# Patient Record
(demographics unavailable — no encounter records)

---

## 2024-10-28 NOTE — HISTORY OF PRESENT ILLNESS
[___ Month(s) Ago] : [unfilled] month(s) ago [FreeTextEntry1] : oct 2024:  Patient overall feels well. No complaints. Off lupkins since oct 2nd 2024, awaiting Ianulumab trial enrollment Compliant with MMF max dose, HCQ 200mg BID,Lisinopril  Denies photosensitivity, malar rash, mouth ulcers, alopecia, dry eyes, dry mouth, joint pains, Raynaud's phenomenon, puffy fingers, pleuritic chest pain, SOB , previous blood transfusion, limb weakness, weight loss, anorexia, fever, chills, other skin rash [Weight Loss] : no weight loss [Malaise] : no malaise [Fever] : no fever [Chills] : no chills [Malar Facial Rash] : no malar facial rash [Oral Ulcers] : no oral ulcers [Cough] : no cough [Dry Mouth] : no dry mouth [Dysphagia] : no dysphagia [Arthralgias] : no arthralgias [Dyspnea] : no dyspnea [Dry Eyes] : no dry eyes

## 2024-10-28 NOTE — REASON FOR VISIT
[Follow-Up: _____] : a [unfilled] follow-up visit [Interpreters_IDNumber] : [id number] [TWNoteComboBox1] : Bermudian

## 2024-10-28 NOTE — PHYSICAL EXAM
[General Appearance - Alert] : alert [General Appearance - In No Acute Distress] : in no acute distress [Sclera] : the sclera and conjunctiva were normal [PERRL With Normal Accommodation] : pupils were equal in size, round, and reactive to light [Extraocular Movements] : extraocular movements were intact [Outer Ear] : the ears and nose were normal in appearance [Oropharynx] : the oropharynx was normal [Neck Appearance] : the appearance of the neck was normal [Neck Cervical Mass (___cm)] : no neck mass was observed [Jugular Venous Distention Increased] : there was no jugular-venous distention [Thyroid Diffuse Enlargement] : the thyroid was not enlarged [Thyroid Nodule] : there were no palpable thyroid nodules [Auscultation Breath Sounds / Voice Sounds] : lungs were clear to auscultation bilaterally [Heart Rate And Rhythm] : heart rate was normal and rhythm regular [Heart Sounds] : normal S1 and S2 [Heart Sounds Gallop] : no gallops [Murmurs] : no murmurs [Heart Sounds Pericardial Friction Rub] : no pericardial rub [Edema] : there was no peripheral edema [] : no rash [No Focal Deficits] : no focal deficits [Oriented To Time, Place, And Person] : oriented to person, place, and time [Impaired Insight] : insight and judgment were intact [Musculoskeletal - Swelling] : no joint swelling seen [Motor Tone] : muscle strength and tone were normal [Abdomen Soft] : soft [FreeTextEntry1] : No signs of synovitis, limitation of ROM or deformity in any joint.

## 2024-10-28 NOTE — REASON FOR VISIT
[Follow-Up: _____] : a [unfilled] follow-up visit [Interpreters_IDNumber] : [id number] [TWNoteComboBox1] : Bulgarian

## 2024-10-28 NOTE — ASSESSMENT
[FreeTextEntry1] : 37y with SLE with LN class V on last biopsy on 8/2019, with primary MSK symptoms here for f/u for SLE and LN class v Planning to enroll to Ianalumab trial .  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 = was on Benlysta (~feb 2021 - feb 2023) d/c due to lack of efficacy with persistent proteinuria  = On max CellCept 3g daily since 2019 = On HCQ 200mg qd  since 2014 = on Lisinopril 20mg qd  = voclosporin 23.7mg BID since 3/2023, pt pt has run out of medication briefly but now taking -Discussed at length about treatment options, rituximab vs ianalumab. Ianalumab is currently ongoing phase 3 clinical trial. Pt opted to consider ianalumab -Serolgies: normal complements, elevated dsDNA 14. UPCR >4G   Plan:  -Stop voclosporin since oct 2nd, for Ianalumab trial -Will reach out to clinical trial team for ianalumab -Continue cellcept 3g qd, HCQ 200mg qd, Farxiga and lisinopril -optho referral given for hcq use  # reproductive health  = has 3 children, s/p b/l tubal ligation   Research co ordinator(martir) will reach out to recruit for the trial   DW Dr. Barroso f/u in 3 months

## 2024-12-19 NOTE — HISTORY OF PRESENT ILLNESS
[FreeTextEntry1] : 39y F with SLE (+DONNA, dsDNA, low C', SSA/SSB>8) and LN class V presenting for follow up.  SLE hx: Dx w lupus 2014. CP, SOB. Denies any fevers, rashes, arthralgias, oral ulcers at that time. Pt says during that time was having renal involvement. She was given IV cytoxan, steroids. Pt was also put on plaquenil, enalapril during that time.   After initial tx until 11/18, pt was well. Pt in 11/18, pt again developed SOB, CP, fatigue, fevers, arthralgias, Raynauds, abdominal, diarrhea. Pt first tx w abx, then was tx for lupus flare, w high dose steroids. Pt also had renal bx in 2018, which showed lupus LN class V + III, mild fibrosis. Pt was put on cellcept, dc/ed on pred 40mg. Pt still continues w Plaquenil.  Pt was then admitted to [facility] 1/19, w anasarca and renal bx 2019 showed LN class V activity 0/24 and chronicity 1/12. Cellcept increased from 2g to 3g daily. Stopped cellcept and steroids in 4/19, as pt ran out of medications. Increase in proteinuria up to 6g. Pt was put on steroid taper, and restarted on cellcept by 5/19. Pt started a Kezar (proteinase inhibitor) lupus nephritis study. Pulsed 5/19 solumedrol 500mg X 3 days then placed on pred 20mg on 5/19. Has persistent proteinuria, despite full dose MMF, and trial benlysta 2/2021-2/2023  3/2023: off Benlysta now, dc due to no improvement on proteinuria reports compliance with Cellcept 3g daily, lisinopril 2.5mg daily, and lasix 20mg daily no LE swelling, but has progressively worse LE pain over 4 months, pain could be at front shin or calves  started on vocosporin on 3/21  No thrombotic events in past. No pregnancies/miscarriages.   7/22/2024: Pt has not followed up with rheumatology for about a year, pt states that there was miscommunication about the appt time and she didn't follow up. Seen to have proteinuria with labs from Dr Hood (nephro). Pt states overall she is feeling well, but has some b/l knee and hand pain that has been ongoing last 4 months that is worse with activity. Has been taking tylenol and intermittent motrin. Also endorses alopecia, Raynauds, dry eyes and dry mouth, and foamy urine. At times her dry mouth makes her feel like she is choking when swallowing solid foods. Denies oral ulcers, rash.  9/9/2024: Pt had renal bx 8/2024 which showed class V LN. Pt denies constitutional symptoms, weight loss, rash, oral ulcer, joint pains, sicca symptoms, hematuria. Has raynauds. Is taking cellcept, HCQ, voclosporin  oct 2024:  Patient overall feels well. No complaints. Off lupkins since oct 2nd 2024, awaiting Ianulumab trial enrollment Compliant with MMF max dose, HCQ 200mg BID,Lisinopril  Denies photosensitivity, malar rash, mouth ulcers, alopecia, dry eyes, dry mouth, joint pains, Raynaud's phenomenon, puffy fingers, pleuritic chest pain, SOB , previous blood transfusion, limb weakness, weight loss, anorexia, fever, chills, other skin rash  12/16/24: Pt states she is doing well, denies joint pain, alopecia, oral ulcers, malar rash. Endorses Raynauds. Saw Dr Arboleda last week to be evaluated for Ianalumab clinical trial, pending labs.  [Anorexia] : no anorexia [Weight Loss] : no weight loss [Malaise] : no malaise [Fever] : no fever [Chills] : no chills [Fatigue] : no fatigue [Depression] : no depression [Malar Facial Rash] : no malar facial rash [Skin Lesions] : no lesions [Skin Nodules] : no skin nodules [Oral Ulcers] : no oral ulcers [Cough] : no cough [Dry Mouth] : no dry mouth [Dysphonia] : no dysphonia [Dysphagia] : no dysphagia [Shortness of Breath] : no shortness of breath [Chest Pain] : no chest pain [Arthralgias] : no arthralgias [Joint Swelling] : no joint swelling [Joint Warmth] : no joint warmth [Joint Deformity] : no joint deformity [Decreased ROM] : no decreased range of motion [Morning Stiffness] : no morning stiffness [Falls] : no falls [Difficulty Standing] : no difficulty standing [Difficulty Walking] : no difficulty walking [Dyspnea] : no dyspnea [Myalgias] : no myalgias [Muscle Weakness] : no muscle weakness [Muscle Spasms] : no muscle spasms [Muscle Cramping] : no muscle cramping [Visual Changes] : no visual changes [Eye Pain] : no eye pain [Eye Redness] : no eye redness [Dry Eyes] : no dry eyes

## 2024-12-19 NOTE — ASSESSMENT
[FreeTextEntry1] : 39y with SLE with LN class V on last biopsy, with primary MSK symptoms here for f/u for SLE and LN class V Planning to enroll to Ianalumab trial .  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 Trialed on benlysta [id number] but no improvement of proteinuria -Persistent proteinuria in 2024, Bx : class v LN membranous, interstitial fibrosis and tubular atrophy  5-10%. = On max CellCept 3g daily since 2019 = On HCQ 200mg qd  since 2014 = on Lisinopril 20mg qd  = voclosporin 23.7mg BID since 3/2023, stopped 10/2024 to be eligible for Ianalumab study -Discussed at length about treatment options, rituximab vs ianalumab. Ianalumab is currently ongoing phase 3 clinical trial. Pt opted to consider ianalumab. First screening appt 12/11, pending labs to evaluate for eligibility for clinical trial  Plan:  -C/w cellcept 3g daily, HCQ 400mg qd, lisinopril -Continue to hold volclosporin since oct 2nd, for Ianalumab trial -Optho referral given for hcq use -Check CBC and CMP  # reproductive health  = has 3 children, s/p b/l tubal ligation   Research co ordinator(martir) will reach out about lab work/eligibility for trial  DW Dr. Barroso f/u in 3 months, if not accepted to trial, f/u Dick

## 2024-12-19 NOTE — HISTORY OF PRESENT ILLNESS
[FreeTextEntry1] : 39y F with SLE (+DONNA, dsDNA, low C', SSA/SSB>8) and LN class V presenting for follow up.  SLE hx: Dx w lupus 2014. CP, SOB. Denies any fevers, rashes, arthralgias, oral ulcers at that time. Pt says during that time was having renal involvement. She was given IV cytoxan, steroids. Pt was also put on plaquenil, enalapril during that time.   After initial tx until 11/18, pt was well. Pt in 11/18, pt again developed SOB, CP, fatigue, fevers, arthralgias, Raynauds, abdominal, diarrhea. Pt first tx w abx, then was tx for lupus flare, w high dose steroids. Pt also had renal bx in 2018, which showed lupus LN class V + III, mild fibrosis. Pt was put on cellcept, dc/ed on pred 40mg. Pt still continues w Plaquenil.  Pt was then admitted to [facility] 1/19, w anasarca and renal bx 2019 showed LN class V activity 0/24 and chronicity 1/12. Cellcept increased from 2g to 3g daily. Stopped cellcept and steroids in 4/19, as pt ran out of medications. Increase in proteinuria up to 6g. Pt was put on steroid taper, and restarted on cellcept by 5/19. Pt started a Kezar (proteinase inhibitor) lupus nephritis study. Pulsed 5/19 solumedrol 500mg X 3 days then placed on pred 20mg on 5/19. Has persistent proteinuria, despite full dose MMF, and trial benlysta 2/2021-2/2023  3/2023: off Benlysta now, dc due to no improvement on proteinuria reports compliance with Cellcept 3g daily, lisinopril 2.5mg daily, and lasix 20mg daily no LE swelling, but has progressively worse LE pain over 4 months, pain could be at front shin or calves  started on vocosporin on 3/21  No thrombotic events in past. No pregnancies/miscarriages.   7/22/2024: Pt has not followed up with rheumatology for about a year, pt states that there was miscommunication about the appt time and she didn't follow up. Seen to have proteinuria with labs from Dr Hood (nephro). Pt states overall she is feeling well, but has some b/l knee and hand pain that has been ongoing last 4 months that is worse with activity. Has been taking tylenol and intermittent motrin. Also endorses alopecia, Raynauds, dry eyes and dry mouth, and foamy urine. At times her dry mouth makes her feel like she is choking when swallowing solid foods. Denies oral ulcers, rash.  9/9/2024: Pt had renal bx 8/2024 which showed class V LN. Pt denies constitutional symptoms, weight loss, rash, oral ulcer, joint pains, sicca symptoms, hematuria. Has raynauds. Is taking cellcept, HCQ, voclosporin  oct 2024:  Patient overall feels well. No complaints. Off lupkins since oct 2nd 2024, awaiting Ianulumab trial enrollment Compliant with MMF max dose, HCQ 200mg BID,Lisinopril  Denies photosensitivity, malar rash, mouth ulcers, alopecia, dry eyes, dry mouth, joint pains, Raynaud's phenomenon, puffy fingers, pleuritic chest pain, SOB , previous blood transfusion, limb weakness, weight loss, anorexia, fever, chills, other skin rash  12/16/24: Pt states she is doing well, denies joint pain, alopecia, oral ulcers, malar rash. Endorses Raynauds. Saw Dr Arboleda last week to be evaluated for Ianalumab clinical trial, pending labs.  [Anorexia] : no anorexia [Weight Loss] : no weight loss [Malaise] : no malaise [Fever] : no fever [Chills] : no chills [Fatigue] : no fatigue [Depression] : no depression [Malar Facial Rash] : no malar facial rash [Skin Lesions] : no lesions [Skin Nodules] : no skin nodules [Oral Ulcers] : no oral ulcers [Cough] : no cough [Dry Mouth] : no dry mouth [Dysphonia] : no dysphonia [Dysphagia] : no dysphagia [Shortness of Breath] : no shortness of breath [Chest Pain] : no chest pain [Arthralgias] : no arthralgias [Joint Swelling] : no joint swelling [Joint Warmth] : no joint warmth [Joint Deformity] : no joint deformity [Decreased ROM] : no decreased range of motion [Morning Stiffness] : no morning stiffness [Falls] : no falls [Difficulty Standing] : no difficulty standing [Difficulty Walking] : no difficulty walking [Dyspnea] : no dyspnea [Myalgias] : no myalgias [Muscle Weakness] : no muscle weakness [Muscle Spasms] : no muscle spasms [Muscle Cramping] : no muscle cramping [Visual Changes] : no visual changes [Eye Pain] : no eye pain [Eye Redness] : no eye redness [Dry Eyes] : no dry eyes

## 2024-12-19 NOTE — REASON FOR VISIT
[[employer] ] : provided by [employer]   [Time Spent: ____ minutes] : Total time spent using  services: [unfilled] minutes. The patient's primary language is not English thus required  services. [Interpreters_IDNumber] : [id number]

## 2024-12-19 NOTE — REASON FOR VISIT
[[employer] ] : provided by [employer]   [Time Spent: ____ minutes] : Total time spent using  services: [unfilled] minutes. The patient's primary language is not English thus required  services. [Interpreters_IDNumber] : [id number]

## 2024-12-19 NOTE — ASSESSMENT
[FreeTextEntry1] : 39y with SLE with LN class V on last biopsy, with primary MSK symptoms here for f/u for SLE and LN class V Planning to enroll to Ianalumab trial .  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 Trialed on benlysta [id number] but no improvement of proteinuria -Persistent proteinuria in 2024, Bx : class v LN membranous, interstitial fibrosis and tubular atrophy  5-10%. = On max CellCept 3g daily since 2019 = On HCQ 200mg qd  since 2014 = on Lisinopril 20mg qd  = voclosporin 23.7mg BID since 3/2023, stopped 10/2024 to be eligible for Ianalumab study -Discussed at length about treatment options, rituximab vs ianalumab. Ianalumab is currently ongoing phase 3 clinical trial. Pt opted to consider ianalumab. First screening appt 12/11, pending labs to evaluate for eligibility for clinical trial  Plan:  -C/w cellcept 3g daily, HCQ 400mg qd, lisinopril -Continue to hold volclosporin since oct 2nd, for Ianalumab trial -Optho referral given for hcq use -Check CBC and CMP  # reproductive health  = has 3 children, s/p b/l tubal ligation   Research co ordinator(martir) will reach out about lab work/eligibility for trial  DW Dr. Barroso f/u in 3 months, if not accepted to trial, f/u Dick

## 2024-12-19 NOTE — PHYSICAL EXAM
[General Appearance - Alert] : alert [General Appearance - In No Acute Distress] : in no acute distress [Sclera] : the sclera and conjunctiva were normal [PERRL With Normal Accommodation] : pupils were equal in size, round, and reactive to light [Extraocular Movements] : extraocular movements were intact [Outer Ear] : the ears and nose were normal in appearance [Oropharynx] : the oropharynx was normal [Neck Appearance] : the appearance of the neck was normal [Neck Cervical Mass (___cm)] : no neck mass was observed [Jugular Venous Distention Increased] : there was no jugular-venous distention [Thyroid Diffuse Enlargement] : the thyroid was not enlarged [Thyroid Nodule] : there were no palpable thyroid nodules [Auscultation Breath Sounds / Voice Sounds] : lungs were clear to auscultation bilaterally [Heart Rate And Rhythm] : heart rate was normal and rhythm regular [Heart Sounds] : normal S1 and S2 [Heart Sounds Gallop] : no gallops [Murmurs] : no murmurs [Heart Sounds Pericardial Friction Rub] : no pericardial rub [Edema] : there was no peripheral edema [Abdomen Soft] : soft [] : no rash [No Focal Deficits] : no focal deficits [Oriented To Time, Place, And Person] : oriented to person, place, and time [Impaired Insight] : insight and judgment were intact [Musculoskeletal - Swelling] : no joint swelling seen [Motor Tone] : muscle strength and tone were normal [FreeTextEntry1] : No signs of synovitis, limitation of ROM or deformity in any joint.

## 2024-12-19 NOTE — ASSESSMENT
[FreeTextEntry1] : 39y with SLE with LN class V on last biopsy, with primary MSK symptoms here for f/u for SLE and LN class V Planning to enroll to Ianalumab trial .  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 Trialed on benlysta [id number] but no improvement of proteinuria -Persistent proteinuria in 2024, Bx : class v LN membranous, interstitial fibrosis and tubular atrophy  5-10%. = On max CellCept 3g daily since 2019 = On HCQ 200mg qd  since 2014 = on Lisinopril 20mg qd  = voclosporin 23.7mg BID since 3/2023, stopped 10/2024 to be eligible for Ianalumab study -Discussed at length about treatment options, rituximab vs ianalumab. Ianalumab is currently ongoing phase 3 clinical trial. Pt opted to consider ianalumab. First screening appt 12/11, pending labs to evaluate for eligibility for clinical trial  Plan:  -C/w cellcept 3g daily, HCQ 400mg qd, lisinopril -Continue to hold volclosporin since oct 2nd, for Ianalumab trial -Optho referral given for hcq use -Check CBC and CMP  # reproductive health  = has 3 children, s/p b/l tubal ligation   Research co ordinator(martir) will reach out about lab work/eligibility for trial  DW Dr. Barroso f/u in 3 months, if not accepted to trial, f/u Dick

## 2024-12-19 NOTE — REASON FOR VISIT
[[employer] ] : provided by [employer]   [Time Spent: ____ minutes] : Total time spent using  services: [unfilled] minutes. The patient's primary language is not English thus required  services. [Interpreters_IDNumber] : [id number]

## 2024-12-19 NOTE — HISTORY OF PRESENT ILLNESS
[FreeTextEntry1] : 39y F with SLE (+DONNA, dsDNA, low C', SSA/SSB>8) and LN class V presenting for follow up.  SLE hx: Dx w lupus 2014. CP, SOB. Denies any fevers, rashes, arthralgias, oral ulcers at that time. Pt says during that time was having renal involvement. She was given IV cytoxan, steroids. Pt was also put on plaquenil, enalapril during that time.   After initial tx until 11/18, pt was well. Pt in 11/18, pt again developed SOB, CP, fatigue, fevers, arthralgias, Raynauds, abdominal, diarrhea. Pt first tx w abx, then was tx for lupus flare, w high dose steroids. Pt also had renal bx in 2018, which showed lupus LN class V + III, mild fibrosis. Pt was put on cellcept, dc/ed on pred 40mg. Pt still continues w Plaquenil.  Pt was then admitted to [facility] 1/19, w anasarca and renal bx 2019 showed LN class V activity 0/24 and chronicity 1/12. Cellcept increased from 2g to 3g daily. Stopped cellcept and steroids in 4/19, as pt ran out of medications. Increase in proteinuria up to 6g. Pt was put on steroid taper, and restarted on cellcept by 5/19. Pt started a Kezar (proteinase inhibitor) lupus nephritis study. Pulsed 5/19 solumedrol 500mg X 3 days then placed on pred 20mg on 5/19. Has persistent proteinuria, despite full dose MMF, and trial benlysta 2/2021-2/2023  3/2023: off Benlysta now, dc due to no improvement on proteinuria reports compliance with Cellcept 3g daily, lisinopril 2.5mg daily, and lasix 20mg daily no LE swelling, but has progressively worse LE pain over 4 months, pain could be at front shin or calves  started on vocosporin on 3/21  No thrombotic events in past. No pregnancies/miscarriages.   7/22/2024: Pt has not followed up with rheumatology for about a year, pt states that there was miscommunication about the appt time and she didn't follow up. Seen to have proteinuria with labs from Dr Hood (nephro). Pt states overall she is feeling well, but has some b/l knee and hand pain that has been ongoing last 4 months that is worse with activity. Has been taking tylenol and intermittent motrin. Also endorses alopecia, Raynauds, dry eyes and dry mouth, and foamy urine. At times her dry mouth makes her feel like she is choking when swallowing solid foods. Denies oral ulcers, rash.  9/9/2024: Pt had renal bx 8/2024 which showed class V LN. Pt denies constitutional symptoms, weight loss, rash, oral ulcer, joint pains, sicca symptoms, hematuria. Has raynauds. Is taking cellcept, HCQ, voclosporin  oct 2024:  Patient overall feels well. No complaints. Off lupkins since oct 2nd 2024, awaiting Ianulumab trial enrollment Compliant with MMF max dose, HCQ 200mg BID,Lisinopril  Denies photosensitivity, malar rash, mouth ulcers, alopecia, dry eyes, dry mouth, joint pains, Raynaud's phenomenon, puffy fingers, pleuritic chest pain, SOB , previous blood transfusion, limb weakness, weight loss, anorexia, fever, chills, other skin rash  12/16/24: Pt states she is doing well, denies joint pain, alopecia, oral ulcers, malar rash. Endorses Raynauds. Saw Dr Arboleda last week to be evaluated for Ianalumab clinical trial, pending labs.  [Anorexia] : no anorexia [Weight Loss] : no weight loss [Malaise] : no malaise [Fever] : no fever [Chills] : no chills [Fatigue] : no fatigue [Depression] : no depression [Malar Facial Rash] : no malar facial rash [Skin Lesions] : no lesions [Skin Nodules] : no skin nodules [Oral Ulcers] : no oral ulcers [Cough] : no cough [Dry Mouth] : no dry mouth [Dysphonia] : no dysphonia [Dysphagia] : no dysphagia [Shortness of Breath] : no shortness of breath [Chest Pain] : no chest pain [Arthralgias] : no arthralgias [Joint Swelling] : no joint swelling [Joint Warmth] : no joint warmth [Joint Deformity] : no joint deformity [Decreased ROM] : no decreased range of motion [Morning Stiffness] : no morning stiffness [Falls] : no falls [Difficulty Standing] : no difficulty standing [Difficulty Walking] : no difficulty walking [Dyspnea] : no dyspnea [Myalgias] : no myalgias [Muscle Weakness] : no muscle weakness [Muscle Spasms] : no muscle spasms [Muscle Cramping] : no muscle cramping [Visual Changes] : no visual changes [Eye Pain] : no eye pain [Eye Redness] : no eye redness [Dry Eyes] : no dry eyes

## 2025-01-07 NOTE — HISTORY OF PRESENT ILLNESS
[Denies] : Denies [No] : No [N/A] : N/A [Declined] : Declined [Right upper extremity] : Right upper extremity [24g] : 24g [Start Time: ___] : Medication Start Time: [unfilled] [End Time: ___] : Medication End Time: [unfilled] [Medication Name: ___] : Medication Name: [unfilled] [Total Amount Administered: ___] : Total Amount Administered: [unfilled] [IV discontinued. Intact. No signs or symptoms of IV complications noted. Time: ___] : IV discontinued. Intact. No signs or symptoms of IV complications noted. Time: [unfilled] [Patient  instructed to seek medical attention with signs and symptoms of adverse effects] : Patient  instructed to seek medical attention with signs and symptoms of adverse effects [Patient left unit in no acute distress] : Patient left unit in no acute distress [Medications administered as ordered and tolerated well.] : Medications administered as ordered and tolerated well. [de-identified] : median cubital vein  [de-identified] : Patient presents for Solumedrol infusion, doing well overall. No recent infection or antibiotic use. Patient tolerated infusion well.

## 2025-01-08 NOTE — HISTORY OF PRESENT ILLNESS
[de-identified] : median cubital vein  [de-identified] : Patient presents for Solumedrol infusion, doing well overall. No recent infection or antibiotic use. Patient tolerated infusion well.